# Patient Record
Sex: MALE | Race: BLACK OR AFRICAN AMERICAN | NOT HISPANIC OR LATINO | URBAN - METROPOLITAN AREA
[De-identification: names, ages, dates, MRNs, and addresses within clinical notes are randomized per-mention and may not be internally consistent; named-entity substitution may affect disease eponyms.]

---

## 2017-12-26 ENCOUNTER — EMERGENCY (EMERGENCY)
Facility: HOSPITAL | Age: 47
LOS: 1 days | Discharge: HOME | End: 2017-12-26
Attending: EMERGENCY MEDICINE | Admitting: EMERGENCY MEDICINE
Payer: MEDICARE

## 2017-12-26 VITALS
DIASTOLIC BLOOD PRESSURE: 68 MMHG | TEMPERATURE: 98 F | OXYGEN SATURATION: 100 % | RESPIRATION RATE: 16 BRPM | HEART RATE: 87 BPM | SYSTOLIC BLOOD PRESSURE: 122 MMHG

## 2017-12-26 VITALS
HEART RATE: 82 BPM | RESPIRATION RATE: 16 BRPM | OXYGEN SATURATION: 100 % | DIASTOLIC BLOOD PRESSURE: 80 MMHG | SYSTOLIC BLOOD PRESSURE: 159 MMHG

## 2017-12-26 PROCEDURE — 99283 EMERGENCY DEPT VISIT LOW MDM: CPT | Mod: 25

## 2017-12-26 NOTE — ED ADULT TRIAGE NOTE - CHIEF COMPLAINT QUOTE
Pt BIB[facility] from Home, Pt c.o Bilateral Arm and Upper Leg cramping, Blurry Vision and Sleepyness with Diffused Abdominal Pain. Denies N [facility] D [facility] . Pt reports have been eating all day. per EMS prior to call Pt FS [id number] and took his Humalog. Denies HA Diziiness SOB CP PMHX of Bipolar HTN DM Anemia HLD

## 2017-12-27 NOTE — ED PROVIDER NOTE - MEDICAL DECISION MAKING DETAILS
48 y/o m presents requesting diabetes management as had volatile fsg, no acute complaints at this time, discussed appropriate diet, given endo referral list as well as [id number]. fsg ok, exam benign, vss, will dc with instructions to f/u. States he has enough meds at home. 48 y/o m presents requesting diabetes management as had labile fsg, no acute complaints at this time, discussed appropriate diet, given endo referral list as well as [id number]. fsg ok, exam benign, vss, will dc with instructions to f/u. States he has enough meds at home.

## 2017-12-27 NOTE — ED PROVIDER NOTE - OBJECTIVE STATEMENT
48 y/o m presents requesting diabetes management. Patient states he has had difficulty getting follow up appointments at [facility] where he normally follows, currently takes lantus/humalog/glipizide for dm management but has had volatile fsg, sometimes being elevated in the 300s. Denies any ha, cp, sob, abd pain, vomiting, diarrhea, dysuria. Notes he has chronic pains in his arms and legs which are no different today and not bothering him at the moment. 46 y/o m presents requesting diabetes management. Patient states he has had difficulty getting follow up appointments at [facility] where he normally follows, currently takes lantus/humalog/glipizide for dm management but has had volatile fsg, sometimes being elevated in the 300s. Denies any vision changes, ha, cp, sob, abd pain, vomiting, diarrhea, dysuria. Notes he has chronic pains in his arms and legs which are no different today and not bothering him right now.
